# Patient Record
Sex: MALE | Race: WHITE | URBAN - NONMETROPOLITAN AREA
[De-identification: names, ages, dates, MRNs, and addresses within clinical notes are randomized per-mention and may not be internally consistent; named-entity substitution may affect disease eponyms.]

---

## 2019-04-01 ENCOUNTER — IMPORTED ENCOUNTER (OUTPATIENT)
Dept: URBAN - NONMETROPOLITAN AREA CLINIC 1 | Facility: CLINIC | Age: 79
End: 2019-04-01

## 2019-04-01 PROBLEM — H25.813: Noted: 2019-04-01

## 2019-04-01 PROBLEM — H35.363: Noted: 2019-04-01

## 2019-04-01 PROBLEM — H02.413: Noted: 2019-04-01

## 2019-04-01 PROBLEM — E11.3291: Noted: 2019-04-01

## 2019-04-01 PROCEDURE — 92004 COMPRE OPH EXAM NEW PT 1/>: CPT

## 2019-04-01 NOTE — PATIENT DISCUSSION
DM with NPDR OD -BS: unknown '-A1C: 6.4 about a month ago. -Stressed the importance of keeping blood sugars under control blood pressure under control and weight normalization and regular visits with PCP. -Explained the possible effects of poorly controlled diabetes and the damage that diabetes can cause to ocular health. -Patient to check HgbA1C.-Pt instructed to contact our office with any vision changes. Ptosis/Bleph:-Ptosis (the upper eyelid being in a lower than normal position) of the upper eyelid was explained to the patient. -RBAs of ptosis repair discussed with patient. Treatment options include observation or surgical correction.-Due to affect of dermatochalasis on aesthetic outcome recommend pt have blepharoplasty during ptosis repair.-Will order ptosis VF and external photos and review results with patient. Cataract OU-Pt would like to wait at this time. -Reviewed symptoms of advancing cataract growth such as glare and halos and decreased vision.-Continue to monitor for now. Pt will notify us if any new symptoms develop. Pt would like to have a blepharoplasty evaluation first and after that is complete will address for cataract surgery.  Letter to Celena

## 2022-04-09 ASSESSMENT — VISUAL ACUITY
OS_CC: 20/50
OD_GLARE: 20/50
OS_GLARE: 20/40
OS_PH: 20/30
OD_PH: 20/30
OD_CC: 20/60

## 2022-04-09 ASSESSMENT — TONOMETRY
OD_IOP_MMHG: 18
OS_IOP_MMHG: 18